# Patient Record
Sex: FEMALE | Race: WHITE
[De-identification: names, ages, dates, MRNs, and addresses within clinical notes are randomized per-mention and may not be internally consistent; named-entity substitution may affect disease eponyms.]

---

## 2020-01-25 ENCOUNTER — HOSPITAL ENCOUNTER (EMERGENCY)
Dept: HOSPITAL 62 - ER | Age: 35
Discharge: HOME | End: 2020-01-25
Payer: MEDICAID

## 2020-01-25 VITALS — DIASTOLIC BLOOD PRESSURE: 89 MMHG | SYSTOLIC BLOOD PRESSURE: 153 MMHG

## 2020-01-25 DIAGNOSIS — R00.2: ICD-10-CM

## 2020-01-25 DIAGNOSIS — F17.210: ICD-10-CM

## 2020-01-25 DIAGNOSIS — R07.9: Primary | ICD-10-CM

## 2020-01-25 DIAGNOSIS — I10: ICD-10-CM

## 2020-01-25 LAB
ADD MANUAL DIFF: NO
ALBUMIN SERPL-MCNC: 4.6 G/DL (ref 3.5–5)
ALP SERPL-CCNC: 84 U/L (ref 38–126)
ANION GAP SERPL CALC-SCNC: 9 MMOL/L (ref 5–19)
AST SERPL-CCNC: 22 U/L (ref 14–36)
BASOPHILS # BLD AUTO: 0.1 10^3/UL (ref 0–0.2)
BASOPHILS NFR BLD AUTO: 0.8 % (ref 0–2)
BILIRUB DIRECT SERPL-MCNC: 0 MG/DL (ref 0–0.4)
BILIRUB SERPL-MCNC: 0.4 MG/DL (ref 0.2–1.3)
BUN SERPL-MCNC: 15 MG/DL (ref 7–20)
CALCIUM: 9.9 MG/DL (ref 8.4–10.2)
CHLORIDE SERPL-SCNC: 104 MMOL/L (ref 98–107)
CO2 SERPL-SCNC: 30 MMOL/L (ref 22–30)
EOSINOPHIL # BLD AUTO: 0.3 10^3/UL (ref 0–0.6)
EOSINOPHIL NFR BLD AUTO: 2.4 % (ref 0–6)
ERYTHROCYTE [DISTWIDTH] IN BLOOD BY AUTOMATED COUNT: 14.3 % (ref 11.5–14)
GLUCOSE SERPL-MCNC: 106 MG/DL (ref 75–110)
HCT VFR BLD CALC: 45.9 % (ref 36–47)
HGB BLD-MCNC: 15.8 G/DL (ref 12–15.5)
LYMPHOCYTES # BLD AUTO: 2 10^3/UL (ref 0.5–4.7)
LYMPHOCYTES NFR BLD AUTO: 18.8 % (ref 13–45)
MCH RBC QN AUTO: 29.3 PG (ref 27–33.4)
MCHC RBC AUTO-ENTMCNC: 34.4 G/DL (ref 32–36)
MCV RBC AUTO: 85 FL (ref 80–97)
MONOCYTES # BLD AUTO: 0.8 10^3/UL (ref 0.1–1.4)
MONOCYTES NFR BLD AUTO: 7.4 % (ref 3–13)
NEUTROPHILS # BLD AUTO: 7.7 10^3/UL (ref 1.7–8.2)
NEUTS SEG NFR BLD AUTO: 70.6 % (ref 42–78)
PLATELET # BLD: 331 10^3/UL (ref 150–450)
POTASSIUM SERPL-SCNC: 4.8 MMOL/L (ref 3.6–5)
PROT SERPL-MCNC: 7.8 G/DL (ref 6.3–8.2)
RBC # BLD AUTO: 5.39 10^6/UL (ref 3.72–5.28)
TOTAL CELLS COUNTED % (AUTO): 100 %
WBC # BLD AUTO: 10.9 10^3/UL (ref 4–10.5)

## 2020-01-25 PROCEDURE — 93005 ELECTROCARDIOGRAM TRACING: CPT

## 2020-01-25 PROCEDURE — 84484 ASSAY OF TROPONIN QUANT: CPT

## 2020-01-25 PROCEDURE — 36415 COLL VENOUS BLD VENIPUNCTURE: CPT

## 2020-01-25 PROCEDURE — 99285 EMERGENCY DEPT VISIT HI MDM: CPT

## 2020-01-25 PROCEDURE — 80053 COMPREHEN METABOLIC PANEL: CPT

## 2020-01-25 PROCEDURE — 84703 CHORIONIC GONADOTROPIN ASSAY: CPT

## 2020-01-25 PROCEDURE — 83735 ASSAY OF MAGNESIUM: CPT

## 2020-01-25 PROCEDURE — 93010 ELECTROCARDIOGRAM REPORT: CPT

## 2020-01-25 PROCEDURE — 85025 COMPLETE CBC W/AUTO DIFF WBC: CPT

## 2020-01-25 PROCEDURE — 84443 ASSAY THYROID STIM HORMONE: CPT

## 2020-01-25 PROCEDURE — 71046 X-RAY EXAM CHEST 2 VIEWS: CPT

## 2020-01-25 NOTE — ER DOCUMENT REPORT
ED General





- General


Chief Complaint: Chest Pain


Stated Complaint: PALPITATION


Time Seen by Provider: 01/25/20 09:47


Primary Care Provider: 


IRMA ZHANG MD [ACTIVE STAFF] - 


JACINTA PAULA MD [ACTIVE STAFF] - Follow up in 3-5 days (for cardiology follow 

up)


TRAVEL OUTSIDE OF THE [place] IN LAST 30 DAYS: No





- HPI


Notes: 





35-year-old female to the emergency department with complaints of chest 

"tightness" and palpitations that have been episodic in nature since November.  

She states she had an episode this morning and she decided to seek help because 

she had been trying to put it off.  She states she has done a couple of 

lifestyle modification such as decreasing her caffeine intake and trying to be 

less stressed in general.  She states that she is decreased her cigarette use 

but still continues to use.  She states this episode started while she was 

smoking a cigarette and thinking about all the things that she needed to do 

today.  She states that she felt like her heart rate got up and actually 

measured it up to 130 and she began to feel like she was having some chest 

tightness.  She denies any lightheadedness, shortness of breath, passing out, 

abdominal pain, nausea, vomiting, diaphoresis.  She states that this episode 

lasted several minutes and has since then resolved.  She denies any leg 

swelling, recent travel, history of clots in her legs, being on birth control, 

or any other complaints.  She also reports that she has a history of high blood 

pressure but does not take anything for it.





- Related Data


Allergies/Adverse Reactions: 


                                        





No Known Drug Allergies Allergy (Verified 01/25/20 09:42)


   











Past Medical History





- General


Information source: Patient, Relative





- Social History


Smoking Status: Current Every Day Smoker


Chew tobacco use (# tins/day): No


Frequency of alcohol use: None


Drug Abuse: None


Lives with: Spouse/Significant other


Family History: Reviewed & Not Pertinent


Patient has suicidal ideation: No


Patient has homicidal ideation: No





- Past Medical History


Cardiac Medical History: Reports: Hx Hypertension





Review of Systems





- Review of Systems


Constitutional: denies: Chills, Diaphoresis, Fever


EENT: No symptoms reported


Cardiovascular: See HPI, Chest pain - Chest tightness, Palpitations, Heart 

racing.  denies: Dyspnea, Syncope, Dizziness, Lightheaded


Respiratory: denies: Cough, Short of breath


Gastrointestinal: denies: Abdominal pain, Diarrhea, Nausea, Vomiting


Genitourinary: No symptoms reported


Female Genitourinary: No symptoms reported


Musculoskeletal: No symptoms reported


Skin: No symptoms reported


Hematologic/Lymphatic: No symptoms reported


Neurological/Psychological: No symptoms reported


-: Yes All other systems reviewed and negative





Physical Exam





- Vital signs


Vitals: 


                                        











Temp Pulse Resp BP Pulse Ox


 


 98.0 F   92   18   169/99 H  100 


 


 01/25/20 09:05  01/25/20 09:05  01/25/20 09:05  01/25/20 09:05  01/25/20 09:05











Interpretation: Hypertensive





- General


General appearance: Appears well, Alert


In distress: None





- HEENT


Head: Normocephalic, Atraumatic


Eyes: Normal


Pupils: PERRL





- Respiratory


Respiratory status: No respiratory distress.  No: Retractions, Tachypnea


Chest status: Nontender.  No: Pain on movement, Pain with cough, Pain with deep 

breathing, Accessory muscle use


Breath sounds: Normal.  No: Rales, Rhonchi, Stridor, Wheezing


Chest palpation: Normal





- Cardiovascular


Rhythm: Regular


Heart sounds: Normal auscultation


Murmur: No





- Abdominal


Inspection: Normal


Distension: No distension


Bowel sounds: Normal


Tenderness: Nontender.  No: Tender, McBurney's point, Valera's sign, Guarding, 

Rebound


Organomegaly: No organomegaly





- Back


Back: Normal, Nontender.  No: CVA tenderness





- Extremities


General upper extremity: Normal inspection, Nontender, Normal color, Normal ROM,

Normal temperature


General lower extremity: Normal inspection, Nontender, Normal color, Normal ROM,

Normal temperature, Normal weight bearing.  No: Jazmine's sign





- Neurological


Neuro grossly intact: Yes


Cognition: Normal


Orientation: AAOx4


Mobile Coma Scale Eye Opening: Spontaneous


Andrey Coma Scale Verbal: Oriented


Andrey Coma Scale Motor: Obeys Commands


Mobile Coma Scale Total: 15


Speech: Normal


Cranial nerves: Normal.  No: Facial palsy


Cerebellar coordination: Normal.  No: Gait ataxia


Motor strength normal: LUE, RUE, LLE, RLE


Additional motor exam normals: Equal .  No: Pronator drift


Sensory: Normal





- Psychological


Associated symptoms: Normal affect, Normal mood





- Skin


Skin Temperature: Warm


Skin Moisture: Dry


Skin Color: Normal





Course





- Re-evaluation


Re-evalutation: 





Impression: Palpitations.  Lab work and EKG as well as chest x-ray are very 

reassuring today.  Patient currently has no symptoms.  She does need to see a 

cardiologist for further evaluation of her palpitations.  There are components 

of her story I do sound like anxiety but she still will need cardiac evaluation 

for the palpitations.  Given information for follow-up.  Patient agrees with the

plan.  Have advised to return if any worsening palpitations, chest pain, 

shortness of breath, passing out or any other concerning symptoms.  Patient 

agrees with the plan.  Educated on smoking cessation.








- Vital Signs


Vital signs: 


                                        











Temp Pulse Resp BP Pulse Ox


 


 98.0 F   92   13   153/89 H  100 


 


 01/25/20 09:05  01/25/20 09:05  01/25/20 12:01  01/25/20 12:00  01/25/20 12:01














- Laboratory


Result Diagrams: 


                                 01/25/20 09:54





                                 01/25/20 09:54


Laboratory results interpreted by me: 


                                        











  01/25/20





  09:54


 


WBC  10.9 H


 


RBC  5.39 H


 


Hgb  15.8 H


 


RDW  14.3 H














- Diagnostic Test


Radiology reviewed: Image reviewed, Reports reviewed





- EKG Interpretation by Me


Additional EKG results interpreted by me: 





Rate: 91, rhythm: Sinus, interpretation: No STEMI, no T wave inversions, no 

prior for comparison








Discharge





- Discharge


Clinical Impression: 


 Palpitations





Chest pain


Qualifiers:


 Chest pain type: unspecified Qualified Code(s): R07.9 - Chest pain, unspecified





Condition: Stable


Disposition: HOME, SELF-CARE


Instructions:  Palpitations (Irregular or Rapid Heartrate) (OM)


Additional Instructions: 


FOLLOW UP WITH CARDIOLOGY WITHOUT FAIL.  RETURN IF WORSENING SYMPTOMS.  REST AT 

HOME TODAY.  DECREASE/STOP SMOKING. 


Referrals: 


IRMA ZHANG MD [ACTIVE STAFF] - 


JACINTA PAULA MD [ACTIVE STAFF] - Follow up in 3-5 days (for cardiology follow 

up)

## 2020-01-25 NOTE — RADIOLOGY REPORT (SQ)
EXAM DESCRIPTION:  CHEST 2 VIEWS



COMPLETED DATE/TIME:  1/25/2020 9:59 am



REASON FOR STUDY:  chest pain



COMPARISON:  None.



EXAM PARAMETERS:  NUMBER OF VIEWS: two views

TECHNIQUE: Digital Frontal and Lateral radiographic views of the chest acquired.

RADIATION DOSE: NA

LIMITATIONS: none



FINDINGS:  LUNGS AND PLEURA: No opacities, masses or pneumothorax. No pleural effusion.

MEDIASTINUM AND HILAR STRUCTURES: No masses or contour abnormalities.

HEART AND VASCULAR STRUCTURES: Heart normal size.  No evidence for failure.

BONES: No acute findings.

HARDWARE: None in the chest.

OTHER: No other significant finding.



IMPRESSION:  NO ACUTE RADIOGRAPHIC FINDING IN THE CHEST.



TECHNICAL DOCUMENTATION:  JOB ID:  [id number]

 2011 [employer]- All Rights Reserved



Reading location - IP/workstation name: [email or web address]

## 2020-01-25 NOTE — ER DOCUMENT REPORT
ED Medical Screen (RME)





- General


Chief Complaint: Chest Pain


Stated Complaint: PALPITATION


Time Seen by Provider: 01/25/20 09:47


Primary Care Provider: 


IRMA ZHANG MD [Primary Care Provider] - Follow up as needed


Notes: 





Patient is a 35-year-old female who presents to the emergency department with a 

chief complaint of palpitations.  Patient reports that she has had 6 episodes of

palpitations since December.  Patient reports she developed chest pain Monday 

and an episode of palpitations on Thursday and then once again this morning.  

Patient reports she can be sitting or standing when her heart rate goes into the

120s and 130s.  Patient reports over the past month she is attempted to decrease

her caffeine intake but has not noticed that this is helped at all.  Patient 

reports when she has her "spells "her hands and feet go numb and she feels 

disoriented.  Patient denies any over-the-counter medications.  Patient reports 

last menstrual cycle started yesterday.  Patient reports she does smoke about 10

to 15 cigarettes/day.  Patient reports she did have thyroid issues when she was 

pregnant years ago.  


TRAVEL OUTSIDE OF THE [place] IN LAST 30 DAYS: No





- Related Data


Allergies/Adverse Reactions: 


                                        





No Known Drug Allergies Allergy (Verified 01/25/20 09:42)


   











Past Medical History





- Social History


Chew tobacco use (# tins/day): No


Frequency of alcohol use: None


Drug Abuse: None





Physical Exam





- Vital signs


Vitals: 





                                        











Temp Pulse Resp BP Pulse Ox


 


 98.0 F   92   18   169/99 H  100 


 


 01/25/20 09:05  01/25/20 09:05  01/25/20 09:05  01/25/20 09:05  01/25/20 09:05














- Cardiovascular


Rhythm: Regular


Heart sounds: Normal auscultation, S1 appreciated, S2 appreciated





Course





- Re-evaluation


Re-evalutation: 





01/25/20 09:50


Patient nontoxic-appearing in triage.  Will obtain basic labs with a troponin, 

magnesium and TSH.





I have greeted and performed a rapid initial assessment of this patient.  A 

comprehensive ED assessment and evaluation of the patient, analysis of test 

results and completion of the medical decision making process will be conducted 

by additional ED providers.





- Vital Signs


Vital signs: 





                                        











Temp Pulse Resp BP Pulse Ox


 


 98.0 F   92   18   169/99 H  100 


 


 01/25/20 09:05  01/25/20 09:05  01/25/20 09:05  01/25/20 09:05  01/25/20 09:05














Doctor's Discharge





- Discharge


Referrals: 


IRMA ZHANG MD [Primary Care Provider] - Follow up as needed